# Patient Record
Sex: FEMALE | URBAN - METROPOLITAN AREA
[De-identification: names, ages, dates, MRNs, and addresses within clinical notes are randomized per-mention and may not be internally consistent; named-entity substitution may affect disease eponyms.]

---

## 2018-06-01 ENCOUNTER — OUTPATIENT (OUTPATIENT)
Dept: OUTPATIENT SERVICES | Facility: HOSPITAL | Age: 23
LOS: 1 days | End: 2018-06-01
Payer: MEDICAID

## 2018-06-01 PROCEDURE — G9001: CPT

## 2018-06-03 ENCOUNTER — EMERGENCY (EMERGENCY)
Facility: HOSPITAL | Age: 23
LOS: 1 days | Discharge: HOME | End: 2018-06-03
Attending: EMERGENCY MEDICINE
Payer: MEDICAID

## 2018-06-03 VITALS
DIASTOLIC BLOOD PRESSURE: 85 MMHG | RESPIRATION RATE: 18 BRPM | TEMPERATURE: 98 F | OXYGEN SATURATION: 98 % | SYSTOLIC BLOOD PRESSURE: 124 MMHG | HEART RATE: 96 BPM

## 2018-06-03 PROBLEM — Z00.00 ENCOUNTER FOR PREVENTIVE HEALTH EXAMINATION: Status: ACTIVE | Noted: 2018-06-03

## 2018-06-03 PROCEDURE — 99281 EMR DPT VST MAYX REQ PHY/QHP: CPT

## 2018-06-03 PROCEDURE — 99282 EMERGENCY DEPT VISIT SF MDM: CPT

## 2018-06-03 RX ORDER — VENLAFAXINE HCL 75 MG
1 CAPSULE, EXT RELEASE 24 HR ORAL
Qty: 7 | Refills: 0 | OUTPATIENT
Start: 2018-06-03 | End: 2018-06-09

## 2018-06-03 NOTE — ED PROVIDER NOTE - OBJECTIVE STATEMENT
23 year-old female, history of anxiety/depression, presents with cc medication refill. Reports that takes Effexor daily x over 1 year and ran out of her medication yesterday. Has bottle with address of pharmacy. Denies any SI/HI, hallucinations or any other complaints. Comes with boyfriend.

## 2018-06-03 NOTE — ED PROVIDER NOTE - PROGRESS NOTE DETAILS
Unable to reach the pharmacy as it is closed. Medication refilled usually by PMD Dr Lee. Doesn't have a psychiatrist. Well-appearing. Will dc with 1 week supply of meds and psych referral through care coordiantor. Pt is well appearing walking with steady gait, stable for discharge and follow up without fail with medical doctor. I had a detailed discussion with the patient and/or guardian regarding the historical points, exam findings, and any diagnostic results supporting the discharge diagnosis. Pt educated on care and need for follow up. Strict return instructions and red flag signs and symptoms discussed with patient. Questions answered. Pt shows understanding of discharge information and agrees to follow.

## 2018-06-06 DIAGNOSIS — R69 ILLNESS, UNSPECIFIED: ICD-10-CM

## 2018-07-01 ENCOUNTER — OUTPATIENT (OUTPATIENT)
Dept: OUTPATIENT SERVICES | Facility: HOSPITAL | Age: 23
LOS: 1 days | End: 2018-07-01

## 2018-07-17 DIAGNOSIS — Z71.89 OTHER SPECIFIED COUNSELING: ICD-10-CM

## 2018-07-17 PROBLEM — F41.9 ANXIETY DISORDER, UNSPECIFIED: Chronic | Status: ACTIVE | Noted: 2018-06-03

## 2023-11-14 ENCOUNTER — NON-APPOINTMENT (OUTPATIENT)
Age: 28
End: 2023-11-14

## 2024-03-29 NOTE — ED PROVIDER NOTE - RESPIRATORY, MLM
Office Note  Chief Complaint   Patient presents with    Office Visit    Pre-Op Exam     Right TKR on 04/04/4 at [facility] by Dr. MIKE Rubin        Pre-Op HPI: Patient is a 72 year old old female here for medical clearance.    Procedure: Right total knee replacement  Surgeon: Dr. Jason Rubin   Location:  [facility]   DOS: 4/4/2024   Indication: primary osteoarthritis of right knee.     Prior Anesthesia: Yes, but had no  prior adverse reaction to general anesthesia.   The patient past medical history consists of:  CVS  Angina: No  Arrhythmia: No  CAD: No  CAD w prior/MI: No  CAD w prior/ PCI: No  CHF: No  Uses Anticoagulants: No    NEUROLOGIC  Seizure: No  CVA: No    PULMONARY  Asthma: No  COPD: No  CLAIRE: yes, does not wear cpap    METABOLIC  Diabetes: Yes  Uses Insulin: Yes  Thyroid Disease: No    GI  Chronic Liver disease: No  Acute Hepatitis: No    RENAL  Renal Disease: No    HEMATOLOGY  Coagulation Delay: No  Primary Hypercoagulable disease: No  PE: No  DVT: No  2ndry Hypercoagulable state: No      Musculoskeletal  Neck OA: No  TMJ OA: No  Wears Dentures: Yes    OTHER  Low Serum Albumin: No  Obesity: Yes    Lifestyle Factors: alcohol use - Yes social , tobacco use - Yes 10 cigarettes / day and illegal drug use - No .     Symptoms (Yes/No)  Easy bleeding: No  Easy bruising: No  Heavy menses: No  Frequent nosebleeds: No  Chest pain: No  Cough: No  Dyspnea: No  Edema: No  Palpitations: No  Wheezing: No    STOP Questionnaire (Sleep Apnea Pre-Operative Risk Assessment): (Yes/No)  Do You Snore Loudly (Louder Than Talking or Loud Enough to be Forrest Through Closed Doors)? No  Do You Often Feel Tired, Fatigued, or Sleepy During Daytime? Yes  Has Anyone Observed You Stop Breathing During Your Sleep? Yes  Do You Have or Are You Being Treated for High Blood Pressure? Yes  BMI More Than 35kg/m2? Yes  Age Over 50 Year Old? Yes  Neck Circumference >16 Inches (40cm)? No  Gender: Male? No  The patient achieved a total Stop-Bang  score of 3; intermediate risk of CLAIRE.   A score of 0-2 = Low Risk of CLAIRE.       A score of 3-4 = Intermediate Risk of CLAIRE.       A score of 5-8 = High Risk of CLAIRE.    Pertinent Family History: No pertinent family history .     Living Situation:  home is secure and supportive and no post-op concerns with her living situation.     History of Infection:  Patient not have any recent infections.     Review of Systems   Constitutional:  Negative for chills, fatigue, fever and unexpected weight change.   HENT:  Positive for rhinorrhea and sneezing. Negative for congestion and sore throat.    Eyes:  Negative for visual disturbance.   Respiratory:  Negative for cough, shortness of breath and wheezing.    Cardiovascular:  Negative for chest pain, palpitations and leg swelling.   Gastrointestinal:  Negative for abdominal pain, constipation, diarrhea, nausea and vomiting.   Genitourinary:  Negative for dysuria and menstrual problem.   Musculoskeletal:  Negative for back pain and myalgias.   Skin:  Negative for rash.   Neurological:  Negative for dizziness, numbness and headaches.   Psychiatric/Behavioral:  Negative for sleep disturbance and suicidal ideas.          Current Outpatient Medications   Medication Sig Dispense Refill    Coenzyme Q10 (COQ-10 PO) Take 1 tablet by mouth daily.      CRANBERRY PO Take 1 tablet by mouth daily.      ibuprofen (MOTRIN) 800 MG tablet Take 800 mg by mouth every 8 hours as needed for Pain.      APPLE CIDER VINEGAR PO Take 1 tablet by mouth daily.      esomeprazole (NexIUM) 40 MG capsule TAKE 1 CAPSULE DAILY 90 capsule 3    allopurinol (ZYLOPRIM) 100 MG tablet TAKE 1 TABLET DAILY 90 tablet 3    atorvastatin (LIPITOR) 10 MG tablet TAKE 1 TABLET DAILY (COURTESY REFILL APPOINTMENT REQUIRED) 90 tablet 3    NIFEdipine XL (PROCARDIA XL) 60 MG 24 hr tablet TAKE 1 TABLET DAILY 90 tablet 3    DULoxetine (CYMBALTA) 60 MG capsule TAKE 1 CAPSULE DAILY 90 capsule 3    metoPROLOL succinate (TOPROL-XL) 50 MG  24 hr tablet TAKE 1 TABLET DAILY 90 tablet 3    lisinopril (ZESTRIL) 40 MG tablet TAKE 1 TABLET DAILY 90 tablet 3    Semaglutide,0.25 or 0.5MG/DOS, (Ozempic, 0.25 or 0.5 MG/DOSE,) 2 MG/3ML Solution Pen-injector Inject 0.5 mg into the skin 1 day a week. (Patient taking differently: Inject 0.5 mg into the skin 1 day a week. Monday) 9 mL 3    insulin glargine (Lantus SoloStar) 100 UNIT/ML pen-injector Inject 15 Units into the skin nightly. Prime 2 units before each dose. 15 mL 3    solifenacin (VESICARE) 5 MG tablet TAKE 1 TABLET DAILY 90 tablet 3    Insulin Pen Needle (Pen Needles) 32G X 4 MM Misc Use as directed 4 x a day 100 each 3     No current facility-administered medications for this visit.       ALLERGIES:   Allergen Reactions    Sulfa Antibiotics Other (See Comments)     Sores in mouth         Patient Active Problem List   Diagnosis    Debility    Class 1 obesity due to excess calories with serious comorbidity and body mass index (BMI) of 33.0 to 33.9 in adult    Primary osteoarthritis of both knees    Type 2 diabetes mellitus with hyperglycemia, with long-term current use of insulin (CMD)    Leg length discrepancy    Osteoarthritis of hip    Benign colonic polyp    Cataract    Chronic venous hypertension    Current every day smoker    Urge and stress incontinence    Venous insufficiency    Spinal stenosis of lumbar region with neurogenic claudication    Moderate major depression (CMD)    Peripheral neuropathy    Spondylolisthesis of lumbar region    Hyperlipidemia associated with type 2 diabetes mellitus (CMD)    Hypertension associated with type 2 diabetes mellitus (CMD)    Diabetic polyneuropathy associated with type 2 diabetes mellitus (CMD)    Neuroforaminal stenosis of lumbar spine    H/O vaginal hysterectomy    Diverticulosis of large intestine without perforation or abscess without bleeding    Daytime sleepiness    Unrefreshed by sleep    Nocturnal hypoxemia    Internal hemorrhoids    Preoperative  examination        Past Medical History:   Diagnosis Date    Anxiety     Arthritis     Awareness under anesthesia     Breast cyst, right     Cataract     Chronic pain     Diabetes mellitus (CMD)     Essential (primary) hypertension     Gastroesophageal reflux disease     Gout     Hypertensive retinopathy     Neurogenic claudication     Poor peripheral circulation     Pseudophakia     RAD (reactive airway disease)     Restless leg syndrome     Sleep apnea     has cpap but does not keep it on all night    Spondylolisthesis, lumbar region     Stomach ulcer     Urinary incontinence        Past Surgical History:   Procedure Laterality Date    Carpal tunnel release Bilateral 2005    Cervical fusion  2013     section, low transverse  1980    Cholecystectomy  1998    Extracapsular cataract removal w insert io lens prosth wo ecp Left 09/15/2023    Hip surgery Left     ORIF    Hysterectomy  2022    Total hip replacement Right 2016    revision 2017    Ulnar nerve repair Left        Family History   Problem Relation Age of Onset    Hepatitis C Mother     Cancer, Breast Mother     Cancer, Colon Father     Cancer, Liver Sister     Stroke Brother        Social History     Socioeconomic History    Marital status: Single     Spouse name: Not on file    Number of children: Not on file    Years of education: Not on file    Highest education level: Not on file   Occupational History    Not on file   Tobacco Use    Smoking status: Every Day     Current packs/day: 0.50     Types: Cigarettes     Passive exposure: Never    Smokeless tobacco: Never   Vaping Use    Vaping Use: never used   Substance and Sexual Activity    Alcohol use: Yes     Comment: social    Drug use: Never    Sexual activity: Not Currently     Birth control/protection: None   Other Topics Concern    Not on file   Social History Narrative    Not on file     Social Determinants of Health     Financial Resource Strain: Low Risk   (1/22/2024)    Financial Resource Strain     Unable to Get: None   Food Insecurity: Not At Risk (7/12/2023)    Food Insecurity     Food Insecurity: Worried or Stressed about Money for Food: Never   Transportation Needs: At Risk (7/12/2023)    PRAPARE - Transportation     Lack of Transportation (Medical): Yes     Lack of Transportation (Non-Medical): Yes   Physical Activity: High Risk (7/12/2023)    Exercise Vital Sign     Days of Exercise per Week: 0 days     Minutes of Exercise per Session: 0 min   Stress: Low Risk  (7/12/2023)    Stress     How Stressed: Not at all   Social Connections: Low Risk  (1/22/2024)    Social Connections     Social Connectivity: 3 to 5 times a week   Interpersonal Safety: Not At Risk (7/12/2023)    Interpersonal Safety     Social Determinants: Intimate Partner Violence Past Fear: No     Social Determinants: Intimate Partner Violence Current Fear: No       Immunization History   Administered Date(s) Administered    COVID Pfizer 12Y+ (Requires Dilution) 02/13/2021, 03/06/2021, 10/04/2021    COVID Pfizer Bivalent 12Y+ 03/11/2023    Influenza, High Dose quadrivalent, preserve-free 10/31/2016, 11/01/2016, 11/04/2020, 11/03/2021, 09/20/2022, 09/18/2023    Influenza, high dose seasonal, preservative-free 11/03/2021    Influenza, quadrivalent, preserve-free 01/21/2016, 10/05/2016, 11/07/2017, 10/20/2018, 09/23/2019    Influenza, seasonal, injectable, preservative free 10/25/2012, 11/13/2013, 11/18/2014, 01/21/2016, 10/05/2016    Novel Influenza I1V5-65 01/16/2010    Pneumococcal Conjugate 13 Valent Vacc (Prevnar 13) 01/21/2016    Pneumococcal Polysaccharide Vacc (Pneumovax 23) 01/07/2009, 07/29/2017, 11/22/2017    Pneumococcal, Unspecified Formulation 11/01/2016    TD Adult, Adsorbed 01/07/2009, 12/03/2021    Td:Adult type tetanus/diphtheria 12/03/2021    Zostavax (Zoster Shingles) 03/08/2012         Physical:    Visit Vitals  /69   Pulse 91   Ht 5' 8\" (1.727 m)   Wt 116.3 kg (256 lb 6.3  oz)   LMP 04/01/2022   SpO2 98%   BMI 38.98 kg/m²         Physical Exam  Vitals and nursing note reviewed.   Constitutional:       General: She is not in acute distress.     Appearance: Normal appearance. She is not toxic-appearing.   HENT:      Head: Normocephalic.      Right Ear: External ear normal.      Left Ear: External ear normal.      Nose: Nose normal.   Eyes:      Extraocular Movements: Extraocular movements intact.      Conjunctiva/sclera: Conjunctivae normal.   Cardiovascular:      Rate and Rhythm: Normal rate and regular rhythm.      Pulses: Normal pulses.   Pulmonary:      Effort: Pulmonary effort is normal. No respiratory distress.      Breath sounds: Normal breath sounds. No wheezing.   Abdominal:      General: Bowel sounds are normal. There is no distension.      Palpations: Abdomen is soft.      Tenderness: There is no abdominal tenderness. There is no guarding.   Musculoskeletal:         General: Normal range of motion.      Cervical back: Normal range of motion.   Skin:     General: Skin is warm and dry.      Findings: No rash.   Neurological:      Mental Status: She is alert. Mental status is at baseline.   Psychiatric:         Mood and Affect: Mood normal.           Assessment and Plan    PRE-OPERATIVE NOTE     PAST MEDICAL HISTORIES        Medications: has a current medication list which includes the following prescription(s): esomeprazole, allopurinol, atorvastatin, nifedipine xl, duloxetine, metoprolol succinate, lisinopril, ozempic (0.25 or 0.5 mg/dose), lantus solostar, solifenacin, pen needles, coenzyme q10, cranberry, ibuprofen, and apple cider vinegar.  Problem List: has Debility; Class 1 obesity due to excess calories with serious comorbidity and body mass index (BMI) of 33.0 to 33.9 in adult; Primary osteoarthritis of both knees; Type 2 diabetes mellitus with hyperglycemia, with long-term current use of insulin (CMD); Leg length discrepancy; Osteoarthritis of hip; Benign colonic  polyp; Cataract; Chronic venous hypertension; Current every day smoker; Urge and stress incontinence; Venous insufficiency; Spinal stenosis of lumbar region with neurogenic claudication; Moderate major depression (CMD); Peripheral neuropathy; Spondylolisthesis of lumbar region; Hyperlipidemia associated with type 2 diabetes mellitus (CMD); Hypertension associated with type 2 diabetes mellitus (CMD); Diabetic polyneuropathy associated with type 2 diabetes mellitus (CMD); Neuroforaminal stenosis of lumbar spine; H/O vaginal hysterectomy; Diverticulosis of large intestine without perforation or abscess without bleeding; Daytime sleepiness; Unrefreshed by sleep; Nocturnal hypoxemia; Internal hemorrhoids; and Preoperative examination on their problem list.  Medical:  has a past medical history of Anxiety, Arthritis, Awareness under anesthesia, Breast cyst, right, Cataract, Chronic pain, Diabetes mellitus (CMD), Essential (primary) hypertension, Gastroesophageal reflux disease, Gout, Hypertensive retinopathy, Neurogenic claudication, Poor peripheral circulation, Pseudophakia, RAD (reactive airway disease), Restless leg syndrome, Sleep apnea, Spondylolisthesis, lumbar region, Stomach ulcer, and Urinary incontinence.    She has no past medical history of Delayed emergence from anesthesia, Difficult intubation, Failed moderate sedation during procedure, Malignant hyperthermia, Motion sickness, or PONV (postoperative nausea and vomiting).  Surgical:  has a past surgical history that includes Cholecystectomy (1998); Cervical fusion (2013); Hip surgery (Left, ); total hip replacement (Right, 2016); carpal tunnel release (Bilateral, );  section, low transverse (); Ulnar nerve repair (Left, ); Hysterectomy - Cervix Removed (2022); and extracapsular cataract removal w insert io lens prosth wo ecp (Left, 09/15/2023).  Family: family history includes Cancer, Breast in her mother; Cancer,  Colon in her father; Cancer, Liver in her sister; Hepatitis C in her mother; Stroke in her brother.  Social:  reports that she has been smoking cigarettes. She has never been exposed to tobacco smoke. She has never used smokeless tobacco. She reports current alcohol use. She reports that she does not use drugs.  Allergies: is allergic to sulfa antibiotics.     SURGICAL RISK AND SCREENINGS     Family History Risks  Adverse Reaction to Anesthesia: No  Bleeding or Blood Disorder: No  Malignant Hyperthermia: No    Personal Surgical History  Anesthetic Complications: No  Bleeding Problems: No  Problems with Surgery: No    Malnutrition Screening Tool Current weight 256 pounds  Have you recently lost weight without trying? No  Have you been eating poorly because of a decreased appetite? No  Score = 0, Not at Risk       Functional Capacity  Are you able to do light housework, dusting, wash dishes, climb a flight of stairs or walk up a hill without chest pain or problems breathing (>4 METS)? Yes    Revised Cardiac Risk Index  Intermediate or Higher Risk Surgery No   History of Ischemic Heart Disease or Cardiac Chest Pain No   Congestive Heart Failure No; Ejection Fraction 60 (06/05/2023)   History of Stroke or TIA No   Last Creatinine >2 No; 0.53 g/dL (03/29/2024)   Prescribed Insulin Yes   Estimated Risk of a Major Cardiac Complication 1 risk factor = 6.0%       Acute Kidney Injury Prevention:  JESUS Risk is Medium based on criteria below:  Diabetes  Age >65  Recommendations  - 24 hours before surgery hold ACE-I/ARB, Diuretics and Potassium  - 5 days before surgery hold NSAIDs    Advance care planning documents on file - yes     ASSESSMENT AND PLAN        1. Preoperative examination  -     Electrocardiogram 12-Lead; Future  -     Prothrombin Time and Partial Thromboplastin Time; Future  -     Comprehensive Metabolic Panel; Future  -     Basic Metabolic Panel; Future  2. Primary osteoarthritis of both knees  -     Prothrombin  Time and Partial Thromboplastin Time; Future  3. Type 2 diabetes mellitus with hyperglycemia, with long-term current use of insulin (CMD)  -     SERVICE TO OPHTHALMOLOGY  -     Prothrombin Time and Partial Thromboplastin Time; Future  4. Diabetic polyneuropathy associated with type 2 diabetes mellitus (CMD)  -     Prothrombin Time and Partial Thromboplastin Time; Future  5. Bilateral lower extremity edema  6. CLAIRE (obstructive sleep apnea)  7. Colon cancer screening  -     Occult Blood - iFOB (aka FIT); Future  8. Encounter for screening mammogram for malignant neoplasm of breast  9. Pain in other specified joint  -     Prothrombin Time and Partial Thromboplastin Time; Future          Preop Optimization  - OPTIMIZED for SURGERY: YES patient is medically optimized for surgery.  - Workup reviewed and/or ordered: CMP and ECG.  - Pre-Op management of pacemaker, dialysis or steroids: not needed.  - Post-Op DVT prophylaxis: per surgical team  - Smoking Status: Every Day Smoker  . Tobacco cessation counseling given.    Pertinent Conditions  None  #Hypertension Associated With Type 2 Diabetes Mellitus (Cmd) the last A1c was 7.1 (01/18/2024), this is trending up compared to 6.8 (09/15/2023).    #Hypertension Associated With Type 2 Diabetes Mellitus (Cmd) today's blood pressure was /69      Medications to Hold   Medications and Supplements:  - Morning of surgery hold any Vitamins AND for 2 weeks hold any Vitamin E or Herbals   - Morning of surgery hold (solifenacin (VESICARE) 5 MG tablet [[id number]])    Antidiabetic:   - If taken once nightly then take 90% of dose the night before surgery of long-acting (insulin glargine (Lantus SoloStar) 100 UNIT/ML pen-injector [[id number]])  - 7 days before surgery do not take (Semaglutide,0.25 or 0.5MG/DOS, (Ozempic, 0.25 or 0.5 MG/DOSE,) 2 MG/3ML Solution Pen-injector [[id number]]). This is to avoid delayed gastric emptying and a possible full stomach.    JESUS Risk (Diuretics,  ACE-I/ARB, NSAIDs):   - 24 hours before surgery hold (lisinopril (ZESTRIL) 40 MG tablet [[id number]])  - 5 days before surgery hold (ibuprofen (MOTRIN) 800 MG tablet [[id number]])      Continue all other medications unless an alternative plan was advised with the surgeon and/or specialist.      Follow Up      Mayuri Quick DO  Copy sent to: Jason Rubin MD               Pre-Op:   Patient is a intermediate risk for surgery.   Patient is medically optimized for surgery.   CMP EKG reviewed.PT/PTT to be done day of admission of surgery.   Stopping smoking within one week before surgery will improve outcome.  Other recommendation:  Send copies to: Referring physician; Presurgical testing.    Patient education completed on disease process, etiology & prognosis.    Patient expresses understanding of the plan.    Proper usage and side effects of medications reviewed & discussed.    Refer to orders.    Return to clinic as clinically indicated as discussed with patient who verbalized understanding of & agreement with the plan.     Mayuri Quick DO    Breath sounds clear and equal bilaterally.

## 2024-04-14 NOTE — ED ADULT NURSE NOTE - CHIEF COMPLAINT QUOTE
[place] as per the pt " I am here for prescription refill " pt denies depression no signs of anxiety